# Patient Record
Sex: FEMALE | Race: WHITE | Employment: OTHER | URBAN - METROPOLITAN AREA
[De-identification: names, ages, dates, MRNs, and addresses within clinical notes are randomized per-mention and may not be internally consistent; named-entity substitution may affect disease eponyms.]

---

## 2020-12-21 ENCOUNTER — APPOINTMENT (OUTPATIENT)
Dept: CT IMAGING | Age: 75
End: 2020-12-21
Payer: MEDICARE

## 2020-12-21 ENCOUNTER — HOSPITAL ENCOUNTER (OUTPATIENT)
Age: 75
Setting detail: OBSERVATION
Discharge: HOME | End: 2020-12-23
Attending: EMERGENCY MEDICINE | Admitting: INTERNAL MEDICINE
Payer: MEDICARE

## 2020-12-21 ENCOUNTER — APPOINTMENT (OUTPATIENT)
Dept: GENERAL RADIOLOGY | Age: 75
End: 2020-12-21
Payer: MEDICARE

## 2020-12-21 PROBLEM — G45.9 TIA (TRANSIENT ISCHEMIC ATTACK): Status: ACTIVE | Noted: 2020-12-21

## 2020-12-21 LAB
A/G RATIO: 1.9 (ref 1.1–2.2)
ALBUMIN SERPL-MCNC: 4.8 G/DL (ref 3.4–5)
ALP BLD-CCNC: 79 U/L (ref 40–129)
ALT SERPL-CCNC: 14 U/L (ref 10–40)
AMPHETAMINE SCREEN, URINE: NORMAL
ANION GAP SERPL CALCULATED.3IONS-SCNC: 11 MMOL/L (ref 3–16)
AST SERPL-CCNC: 17 U/L (ref 15–37)
BARBITURATE SCREEN URINE: NORMAL
BASOPHILS ABSOLUTE: 0 K/UL (ref 0–0.2)
BASOPHILS RELATIVE PERCENT: 0.4 %
BENZODIAZEPINE SCREEN, URINE: NORMAL
BILIRUB SERPL-MCNC: 0.3 MG/DL (ref 0–1)
BILIRUBIN URINE: NEGATIVE
BLOOD, URINE: NEGATIVE
BUN BLDV-MCNC: 19 MG/DL (ref 7–20)
CALCIUM SERPL-MCNC: 10 MG/DL (ref 8.3–10.6)
CANNABINOID SCREEN URINE: NORMAL
CHLORIDE BLD-SCNC: 99 MMOL/L (ref 99–110)
CLARITY: CLEAR
CO2: 27 MMOL/L (ref 21–32)
COCAINE METABOLITE SCREEN URINE: NORMAL
COLOR: YELLOW
CREAT SERPL-MCNC: 1 MG/DL (ref 0.6–1.2)
EOSINOPHILS ABSOLUTE: 0.1 K/UL (ref 0–0.6)
EOSINOPHILS RELATIVE PERCENT: 1.2 %
ETHANOL: NORMAL MG/DL (ref 0–0.08)
GFR AFRICAN AMERICAN: >60
GFR NON-AFRICAN AMERICAN: 54
GLOBULIN: 2.5 G/DL
GLUCOSE BLD-MCNC: 117 MG/DL (ref 70–99)
GLUCOSE BLD-MCNC: 119 MG/DL (ref 70–99)
GLUCOSE URINE: NEGATIVE MG/DL
HCT VFR BLD CALC: 41 % (ref 36–48)
HEMOGLOBIN: 13.6 G/DL (ref 12–16)
KETONES, URINE: NEGATIVE MG/DL
LEUKOCYTE ESTERASE, URINE: NEGATIVE
LYMPHOCYTES ABSOLUTE: 2.3 K/UL (ref 1–5.1)
LYMPHOCYTES RELATIVE PERCENT: 24.8 %
Lab: NORMAL
MAGNESIUM: 2 MG/DL (ref 1.8–2.4)
MCH RBC QN AUTO: 30.6 PG (ref 26–34)
MCHC RBC AUTO-ENTMCNC: 33.1 G/DL (ref 31–36)
MCV RBC AUTO: 92.5 FL (ref 80–100)
METHADONE SCREEN, URINE: NORMAL
MICROSCOPIC EXAMINATION: NORMAL
MONOCYTES ABSOLUTE: 0.5 K/UL (ref 0–1.3)
MONOCYTES RELATIVE PERCENT: 5.5 %
NEUTROPHILS ABSOLUTE: 6.4 K/UL (ref 1.7–7.7)
NEUTROPHILS RELATIVE PERCENT: 68.1 %
NITRITE, URINE: NEGATIVE
OPIATE SCREEN URINE: NORMAL
OXYCODONE URINE: NORMAL
PDW BLD-RTO: 13.5 % (ref 12.4–15.4)
PERFORMED ON: ABNORMAL
PH UA: 6
PH UA: 6 (ref 5–8)
PHENCYCLIDINE SCREEN URINE: NORMAL
PLATELET # BLD: 287 K/UL (ref 135–450)
PMV BLD AUTO: 7.5 FL (ref 5–10.5)
POTASSIUM REFLEX MAGNESIUM: 3.4 MMOL/L (ref 3.5–5.1)
PROPOXYPHENE SCREEN: NORMAL
PROTEIN UA: NEGATIVE MG/DL
RBC # BLD: 4.43 M/UL (ref 4–5.2)
SODIUM BLD-SCNC: 137 MMOL/L (ref 136–145)
SPECIFIC GRAVITY UA: 1.01 (ref 1–1.03)
TOTAL PROTEIN: 7.3 G/DL (ref 6.4–8.2)
TROPONIN: <0.01 NG/ML
URINE REFLEX TO CULTURE: NORMAL
URINE TYPE: NORMAL
UROBILINOGEN, URINE: 0.2 E.U./DL
WBC # BLD: 9.4 K/UL (ref 4–11)

## 2020-12-21 PROCEDURE — 99285 EMERGENCY DEPT VISIT HI MDM: CPT

## 2020-12-21 PROCEDURE — 71045 X-RAY EXAM CHEST 1 VIEW: CPT

## 2020-12-21 PROCEDURE — 83735 ASSAY OF MAGNESIUM: CPT

## 2020-12-21 PROCEDURE — 2580000003 HC RX 258: Performed by: NURSE PRACTITIONER

## 2020-12-21 PROCEDURE — G0378 HOSPITAL OBSERVATION PER HR: HCPCS

## 2020-12-21 PROCEDURE — 70450 CT HEAD/BRAIN W/O DYE: CPT

## 2020-12-21 PROCEDURE — 85025 COMPLETE CBC W/AUTO DIFF WBC: CPT

## 2020-12-21 PROCEDURE — G0480 DRUG TEST DEF 1-7 CLASSES: HCPCS

## 2020-12-21 PROCEDURE — 80307 DRUG TEST PRSMV CHEM ANLYZR: CPT

## 2020-12-21 PROCEDURE — 93005 ELECTROCARDIOGRAM TRACING: CPT | Performed by: EMERGENCY MEDICINE

## 2020-12-21 PROCEDURE — 84484 ASSAY OF TROPONIN QUANT: CPT

## 2020-12-21 PROCEDURE — 80053 COMPREHEN METABOLIC PANEL: CPT

## 2020-12-21 PROCEDURE — 81003 URINALYSIS AUTO W/O SCOPE: CPT

## 2020-12-21 RX ORDER — SODIUM CHLORIDE 9 MG/ML
INJECTION, SOLUTION INTRAVENOUS CONTINUOUS
Status: ACTIVE | OUTPATIENT
Start: 2020-12-21 | End: 2020-12-22

## 2020-12-21 RX ORDER — ONDANSETRON 2 MG/ML
4 INJECTION INTRAMUSCULAR; INTRAVENOUS EVERY 6 HOURS PRN
Status: DISCONTINUED | OUTPATIENT
Start: 2020-12-21 | End: 2020-12-23 | Stop reason: HOSPADM

## 2020-12-21 RX ORDER — ASPIRIN 81 MG/1
81 TABLET ORAL DAILY
Status: DISCONTINUED | OUTPATIENT
Start: 2020-12-22 | End: 2020-12-23 | Stop reason: HOSPADM

## 2020-12-21 RX ORDER — SODIUM CHLORIDE 0.9 % (FLUSH) 0.9 %
10 SYRINGE (ML) INJECTION PRN
Status: DISCONTINUED | OUTPATIENT
Start: 2020-12-21 | End: 2020-12-23 | Stop reason: HOSPADM

## 2020-12-21 RX ORDER — PROMETHAZINE HYDROCHLORIDE 25 MG/1
12.5 TABLET ORAL EVERY 6 HOURS PRN
Status: DISCONTINUED | OUTPATIENT
Start: 2020-12-21 | End: 2020-12-23 | Stop reason: HOSPADM

## 2020-12-21 RX ORDER — POLYETHYLENE GLYCOL 3350 17 G/17G
17 POWDER, FOR SOLUTION ORAL DAILY PRN
Status: DISCONTINUED | OUTPATIENT
Start: 2020-12-21 | End: 2020-12-23 | Stop reason: HOSPADM

## 2020-12-21 RX ORDER — ASPIRIN 300 MG/1
300 SUPPOSITORY RECTAL DAILY
Status: DISCONTINUED | OUTPATIENT
Start: 2020-12-22 | End: 2020-12-23 | Stop reason: HOSPADM

## 2020-12-21 RX ORDER — ATORVASTATIN CALCIUM 80 MG/1
80 TABLET, FILM COATED ORAL NIGHTLY
Status: DISCONTINUED | OUTPATIENT
Start: 2020-12-21 | End: 2020-12-23 | Stop reason: HOSPADM

## 2020-12-21 RX ORDER — SODIUM CHLORIDE 0.9 % (FLUSH) 0.9 %
10 SYRINGE (ML) INJECTION EVERY 12 HOURS SCHEDULED
Status: DISCONTINUED | OUTPATIENT
Start: 2020-12-21 | End: 2020-12-23 | Stop reason: HOSPADM

## 2020-12-21 RX ORDER — LABETALOL HYDROCHLORIDE 5 MG/ML
10 INJECTION, SOLUTION INTRAVENOUS EVERY 4 HOURS PRN
Status: DISCONTINUED | OUTPATIENT
Start: 2020-12-21 | End: 2020-12-23 | Stop reason: HOSPADM

## 2020-12-21 RX ADMIN — SODIUM CHLORIDE: 9 INJECTION, SOLUTION INTRAVENOUS at 23:49

## 2020-12-22 ENCOUNTER — APPOINTMENT (OUTPATIENT)
Dept: GENERAL RADIOLOGY | Age: 75
End: 2020-12-22
Payer: MEDICARE

## 2020-12-22 ENCOUNTER — APPOINTMENT (OUTPATIENT)
Dept: VASCULAR LAB | Age: 75
End: 2020-12-22
Payer: MEDICARE

## 2020-12-22 ENCOUNTER — APPOINTMENT (OUTPATIENT)
Dept: MRI IMAGING | Age: 75
End: 2020-12-22
Payer: MEDICARE

## 2020-12-22 LAB
ANION GAP SERPL CALCULATED.3IONS-SCNC: 9 MMOL/L (ref 3–16)
BUN BLDV-MCNC: 14 MG/DL (ref 7–20)
CALCIUM SERPL-MCNC: 9.6 MG/DL (ref 8.3–10.6)
CHLORIDE BLD-SCNC: 103 MMOL/L (ref 99–110)
CHOLESTEROL, TOTAL: 245 MG/DL (ref 0–199)
CO2: 27 MMOL/L (ref 21–32)
CREAT SERPL-MCNC: 0.9 MG/DL (ref 0.6–1.2)
EKG ATRIAL RATE: 91 BPM
EKG DIAGNOSIS: NORMAL
EKG P AXIS: 61 DEGREES
EKG P-R INTERVAL: 144 MS
EKG Q-T INTERVAL: 350 MS
EKG QRS DURATION: 92 MS
EKG QTC CALCULATION (BAZETT): 430 MS
EKG R AXIS: 14 DEGREES
EKG T AXIS: 62 DEGREES
EKG VENTRICULAR RATE: 91 BPM
GFR AFRICAN AMERICAN: >60
GFR NON-AFRICAN AMERICAN: >60
GLUCOSE BLD-MCNC: 105 MG/DL (ref 70–99)
HCT VFR BLD CALC: 40.4 % (ref 36–48)
HDLC SERPL-MCNC: 94 MG/DL (ref 40–60)
HEMOGLOBIN: 13.6 G/DL (ref 12–16)
LDL CHOLESTEROL CALCULATED: 140 MG/DL
MCH RBC QN AUTO: 31.2 PG (ref 26–34)
MCHC RBC AUTO-ENTMCNC: 33.6 G/DL (ref 31–36)
MCV RBC AUTO: 93 FL (ref 80–100)
PDW BLD-RTO: 13.3 % (ref 12.4–15.4)
PLATELET # BLD: 267 K/UL (ref 135–450)
PMV BLD AUTO: 7.4 FL (ref 5–10.5)
POTASSIUM REFLEX MAGNESIUM: 4.1 MMOL/L (ref 3.5–5.1)
RBC # BLD: 4.35 M/UL (ref 4–5.2)
SODIUM BLD-SCNC: 139 MMOL/L (ref 136–145)
TRIGL SERPL-MCNC: 57 MG/DL (ref 0–150)
VLDLC SERPL CALC-MCNC: 11 MG/DL
WBC # BLD: 6.7 K/UL (ref 4–11)

## 2020-12-22 PROCEDURE — 93010 ELECTROCARDIOGRAM REPORT: CPT | Performed by: INTERNAL MEDICINE

## 2020-12-22 PROCEDURE — C8929 TTE W OR WO FOL WCON,DOPPLER: HCPCS

## 2020-12-22 PROCEDURE — G0378 HOSPITAL OBSERVATION PER HR: HCPCS

## 2020-12-22 PROCEDURE — 85027 COMPLETE CBC AUTOMATED: CPT

## 2020-12-22 PROCEDURE — 96372 THER/PROPH/DIAG INJ SC/IM: CPT

## 2020-12-22 PROCEDURE — 97161 PT EVAL LOW COMPLEX 20 MIN: CPT

## 2020-12-22 PROCEDURE — 6370000000 HC RX 637 (ALT 250 FOR IP): Performed by: NURSE PRACTITIONER

## 2020-12-22 PROCEDURE — 97165 OT EVAL LOW COMPLEX 30 MIN: CPT

## 2020-12-22 PROCEDURE — 70551 MRI BRAIN STEM W/O DYE: CPT

## 2020-12-22 PROCEDURE — 73600 X-RAY EXAM OF ANKLE: CPT

## 2020-12-22 PROCEDURE — 97530 THERAPEUTIC ACTIVITIES: CPT

## 2020-12-22 PROCEDURE — 92610 EVALUATE SWALLOWING FUNCTION: CPT

## 2020-12-22 PROCEDURE — 2580000003 HC RX 258: Performed by: NURSE PRACTITIONER

## 2020-12-22 PROCEDURE — 6360000002 HC RX W HCPCS: Performed by: NURSE PRACTITIONER

## 2020-12-22 PROCEDURE — 80048 BASIC METABOLIC PNL TOTAL CA: CPT

## 2020-12-22 PROCEDURE — 99219 PR INITIAL OBSERVATION CARE/DAY 50 MINUTES: CPT | Performed by: PSYCHIATRY & NEUROLOGY

## 2020-12-22 PROCEDURE — 80061 LIPID PANEL: CPT

## 2020-12-22 PROCEDURE — 36415 COLL VENOUS BLD VENIPUNCTURE: CPT

## 2020-12-22 PROCEDURE — 97110 THERAPEUTIC EXERCISES: CPT

## 2020-12-22 PROCEDURE — 83036 HEMOGLOBIN GLYCOSYLATED A1C: CPT

## 2020-12-22 PROCEDURE — 6360000004 HC RX CONTRAST MEDICATION: Performed by: NURSE PRACTITIONER

## 2020-12-22 RX ADMIN — ASPIRIN 81 MG: 81 TABLET, COATED ORAL at 09:16

## 2020-12-22 RX ADMIN — ENOXAPARIN SODIUM 40 MG: 40 INJECTION SUBCUTANEOUS at 09:16

## 2020-12-22 RX ADMIN — SODIUM CHLORIDE, PRESERVATIVE FREE 10 ML: 5 INJECTION INTRAVENOUS at 00:21

## 2020-12-22 NOTE — ED NOTES
RN spoke with daughter. Daughter stated patient laid down for a nap at 17:15 woke up at 18:00 disoriented. Pt stated \"something is wrong. \" Patient is on the go per daughter. 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       Cliff Verde RN  12/21/20 8966

## 2020-12-22 NOTE — ED NOTES
Bed: 14  Expected date:   Expected time:   Means of arrival:   Comments:  [address] Mcgeejeffry Lopez  12/21/20 2023

## 2020-12-22 NOTE — CONSULTS
In patient Neurology consult        [facility] Neurology      MD Onel Wiseman  1945    Date of Service: 2020    Referring Physician: Jared Rao MD      Reason for the consult and CC: Acute encephalopathy, speech impairment and possible stroke. HPI:   The patient is a 77y.o.  years old female without significant past medical history was admitted to the hospital yesterday with acute encephalopathy and speech impairment. Symptoms started yesterday afternoon. Description was sudden onset of confusion and difficulties with word findings and not following directions. She was having difficulties finding her way at her house. Degree was severe. Duration was at least 2 or 3 hours. No triggers or other associated symptoms. No falling or injury, weakness or numbness or recent fever. No chest pain. No other relieving or aggravating factors. She came to the ED where she was back to her baseline. Initial CT of the head showed no acute stroke. CTA showed no large vessel occlusion. She was admitted to the hospital.  Today she feels back to normal.  Blood pressure is mildly elevated. No history of strokes or TIA in the past.  She does not smoke. No history of DVT or PE. Other review of system was unremarkable. Family history: Noncontributory    Past medical history: Noncontributory      Past Surgical History:   Procedure Laterality Date    BREAST SURGERY       SECTION      COSMETIC SURGERY      EYE SURGERY      FRACTURE SURGERY      TONSILLECTOMY          History reviewed. No pertinent past medical history.   Social History     Tobacco Use    Smoking status: Former Smoker     Years: 2.00     Types: Cigarettes    Smokeless tobacco: Never Used   Substance Use Topics    Alcohol use: Yes     Comment: half a glass of wine most days    Drug use: Never     No Known Allergies  Current Facility-Administered Medications   Medication Dose Route Frequency Provider Last Rate Last Admin    sodium chloride flush 0.9 % injection 10 mL  10 mL Intravenous 2 times per day Sinan Elizabeth, APRN - CNP   10 mL at 12/22/20 0021    sodium chloride flush 0.9 % injection 10 mL  10 mL Intravenous PRN Sinan Elizabeth, APRN - CNP        promethazine (PHENERGAN) tablet 12.5 mg  12.5 mg Oral Q6H PRN Sinan Elizabeth, APRN - CNP        Or    ondansetron (ZOFRAN) injection 4 mg  4 mg Intravenous Q6H PRN Sinan Elizabeth, APRN - CNP        polyethylene glycol (GLYCOLAX) packet 17 g  17 g Oral Daily PRN Sinan Elizabeth, APRN - CNP        enoxaparin (LOVENOX) injection 40 mg  40 mg Subcutaneous Daily Sinan Elizabeth, APRN - CNP   40 mg at 12/22/20 0916    aspirin EC tablet 81 mg  81 mg Oral Daily Sinan Elizabeth, APRN - CNP   81 mg at 12/22/20 7307    Or    aspirin suppository 300 mg  300 mg Rectal Daily Sinan Elizabeth, APRN - CNP        perflutren lipid microspheres (DEFINITY) injection 1.65 mg  1.5 mL Intravenous ONCE PRN Sinan Elizabeth, APRN - CNP        atorvastatin (LIPITOR) tablet 80 mg  80 mg Oral Nightly Sinan Elizabeth, APRN - CNP        labetalol (NORMODYNE;TRANDATE) injection 10 mg  10 mg Intravenous Q4H PRN Sinan Elizabeth, APRN - CNP           ROS : A 10-14 system review of constitutional, cardiovascular, respiratory, eyes, musculoskeletal, endocrine, GI, ENT, skin, hematological, genitourinary, psychiatric and neurologic systems was obtained and updated today and is unremarkable except as mentioned in my HPI      Exam:     Constitutional:   Vitals:    12/21/20 2320 12/22/20 0336 12/22/20 0810 12/22/20 0910   BP: (!) 183/98 (!) 147/88 (!) 175/102 (!) 157/90   Pulse: 98 73  86   Resp: 20 18  16   Temp: 98.6 °F (37 °C) 97.8 °F (36.6 °C)  98.5 °F (36.9 °C)   TempSrc: Oral Oral  Oral   SpO2: 98% 98% 99% 98%   Weight:       Height:           General appearance:  Normal development and appear in no acute distress. Eye: No icterus.    Fundus: Funduscopic examination cannot be performed due to COVID19 restrictions and precautions. Neck: supple  Cardiovascular: No lower leg edema with good pulsation. Mental Status:   Oriented to person, place, problem, and time. Memory: Aware of recent and remote event. Good immediate recall. Intact remote memory  Normal attention span and concentration. Language: intact naming, repeating and fluency   Good fund of Knowledge. Aware of current events and vocabulary   Cranial Nerves:   II: Artificial right eye. Left pupils: equal, round, reactive to light  III,IV,VI: Extra Ocular Movements are intact on the left and limited on the right due to artificial eye. No nystagmus  V: Facial sensation is intact   VII: Facial strength and movements: intact and symmetric  VIII: Hearing: Intact  IX: Palate elevation is symmetric  XI: Shoulder shrug is intact  XII: Tongue movements are normal  Musculoskeletal: 5/5 in all 4 extremities. Tone: Normal tone. Reflexes: 2+ in the upper extremity and 2+ in the lower extremity   Planters: flexor bilaterally. Coordination: no pronator drift, no dysmetria with FNF in upper extremities. Normal REM. Sensation: normal to all modalities in both arms and legs. Gait/Posture: Steady. Patient  Data:  LABS:   Lab Results   Component Value Date     12/22/2020    K 4.1 12/22/2020     12/22/2020    CO2 27 12/22/2020    BUN 14 12/22/2020    CREATININE 0.9 12/22/2020    GFRAA >60 12/22/2020    LABGLOM >60 12/22/2020    GLUCOSE 105 12/22/2020    MG 2.00 12/21/2020    CALCIUM 9.6 12/22/2020     Lab Results   Component Value Date    WBC 6.7 12/22/2020    RBC 4.35 12/22/2020    HGB 13.6 12/22/2020    HCT 40.4 12/22/2020    MCV 93.0 12/22/2020    RDW 13.3 12/22/2020     12/22/2020   No results found for: INR, PROTIME    Neuroimaging were independently reviewed by myself and discussed results with the patient  Reviewed notes from different physicians  Reviewed lab and blood testing    Impression:  Acute aphasia and encephalopathy.   Possible TIA versus CVA. So far cryptogenic  Hypertension. ?  New diagnosis    Recommendation:  MRI brain  Echo  Carotid Doppler  Telemetry  A1c  Lipid panel  Aspirin  Statin  DVT and GI prophylaxis  Speech evaluation  Monitor blood pressure for now  Neurochecks  Stroke prevention was discussed with the patient  Will need event monitor with her PCP for four weeks giving possible cryptogenic TIA versus CVA  DC planning likely tomorrow. Thank you for referring such patient. If you have any questions regarding my consult note, please don't hesitate to call me. Anali Ibarra MD  [phone]    This dictation was generated by voice recognition computer software.  Although all attempts are made to edit the dictation for accuracy, there may be errors in the  transcription that are not intended

## 2020-12-22 NOTE — PROGRESS NOTES
Occupational Therapy   Occupational Therapy Initial Assessment and Treatment  Date: 2020   Patient Name: Noy Guillen  MRN: [id number]     : 1945    Date of Service: 2020    Discharge Recommendations:  Home with assist PRN and OP therapy for LUE strength limitations     Assessment   Performance deficits / Impairments: Decreased strength  Assessment: Patient presents as 77year old female s/p admission for speech disturbance and confusion and today demonstrates no confusion via Short Blessed test although does demonstrate high blood pressure and slightly decreased LUE strength that may be attributed to a prior shoulder injury per patient report. Patient was provided exercises this date and educated on benefits of OP therapy for maximizing LUE strength and function. Patient verbalizes understanding of recommendations for home with assist PRN and OP therapy to manage LUE strength limitations despite a potentially chronic issue. Patient is safe to discharge home with assist PRN. Prognosis: Good  Decision Making: Low Complexity  OT Education: OT Role;Plan of Care;Home Exercise Program  Patient Education: disease specific: role of OT, fall risk precautions, discharge recommendations  REQUIRES OT FOLLOW UP: Yes  Activity Tolerance  Activity Tolerance: Patient Tolerated treatment well  Activity Tolerance: B.P. 176/100, HR 87,O2 99%  Safety Devices  Safety Devices in place: Yes  Type of devices: Left in bed;Nurse notified;Call light within reach         Patient Diagnosis(es): The primary encounter diagnosis was TIA (transient ischemic attack). A diagnosis of Transient alteration of awareness was also pertinent to this visit. has no past medical history on file. has a past surgical history that includes Breast surgery; eye surgery; fracture surgery; Tonsillectomy; Cosmetic surgery; and  section.     Restrictions  Restrictions/Precautions  Restrictions/Precautions: Fall Risk, General Precautions  Position Activity Restriction  Other position/activity restrictions: full code, up as tolerated, medium fall risk per nursing order    Subjective   General  Chart Reviewed: Yes  Patient assessed for rehabilitation services?: Yes  Additional Pertinent Hx: entered hospital with speech disturbance per chart review  Referring Practitioner: Rose Colin NP, 12/21  Diagnosis: TIA  Subjective  Subjective: Patient agreeable to OT evaluation and treatment, in bed at time of entry. RN approved OT.   Patient Currently in Pain: Denies  Vital Signs  Level of Consciousness: Alert (0)  Patient Currently in Pain: Denies  Social/Functional History  Social/Functional History  Lives With: Spouse  Type of Home: Apartment  Home Layout: One level  Home Access: Level entry  Bathroom Shower/Tub: Tub/Shower unit  Bathroom Toilet: Standard  Bathroom Equipment: Grab bars in shower  ADL Assistance: Independent  Homemaking Assistance: 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[address]: walking every day, walking with friends 2x/week     Objective   Vision: Impaired  Vision Exceptions: Wears glasses at all times  Hearing: Within functional limits    Orientation  Overall Orientation Status: Within Normal Limits  Observation/Palpation  Posture: Fair  Observation: slightly winged LUE scapula, history of shoulder injury per patient report  Balance  Sitting Balance: Independent  Standing Balance: Modified independent   Toilet Transfers  Toilet - Technique: Ambulating  Toilet Transfer: Stand by assistance  Toilet Transfers Comments: cues to bring IV  ADL  Grooming: Modified independent ;Supervision  LE Dressing: Modified independent ;Supervision  Toileting: Modified independent ;Supervision  Tone RUE  RUE Tone: Normotonic  Tone LUE  LUE Tone: Normotonic  Coordination  Movements Are Fluid And Coordinated: Yes     Bed mobility  Supine to Sit: Supervision  Sit to Supine: Supervision  Transfers  Sit to stand: Supervision  Stand to sit: Supervision  Vision - Basic Assessment  Prior Vision: Wears glasses all the time  Patient Visual Report: No visual complaint reported. Vision Comments: prosthetic eye  Cognition  Overall Cognitive Status: WFL  Cognition Comment: Short Blessed Test Score of 0 indicating normal cognition  Perception  Overall Perceptual Status: WFL     Sensation  Overall Sensation Status: WFL        LUE AROM (degrees)  LUE AROM : WFL  LUE General AROM: slightly limited for full range due to previous shoulder injury  RUE AROM (degrees)  RUE AROM : WFL  LUE Strength  Gross LUE Strength: WFL  LUE Strength Comment: slightly decreased distally for LUE 3+/5  RUE Strength  Gross RUE Strength: WFL  RUE Strength Comment: 4+/5 for RUE     Hand Dominance  Hand Dominance: Right     Plan   Plan  Times per week: 1 additional visit to review HEP PRN     Access Code: [email or web address]   URL: [email or web address]   Date: 12/22/2020   Prepared by: Naeem Heading     Exercises   Seated Scapular Retraction - 10 reps - 1-2 sets - 1-2x daily - 7x weekly   Prone W Scapular Retraction - 10 reps - 1-2 sets - 1-2x daily - 7x weekly   Seated Forearm Pronation and Supination with Hammer - 10 reps - 1-2 sets - 1-2x daily - 7x weekly   Seated Wrist Extension with Dumbbell - 10 reps - 1-2 sets - 1-2x daily - 7x weekly   Wrist Flexion with Dumbbell - 10 reps - 1-2 sets - 1-2x daily - 7x weekly     AM-PAC Score  AM-PAC Inpatient Daily Activity Raw Score: 24 (12/22/20 1107)  AM-PAC Inpatient ADL T-Scale Score : 57.54 (12/22/20 1107)  ADL Inpatient CMS 0-100% Score: 0 (12/22/20 1107)  ADL Inpatient CMS G-Code Modifier : [address] (12/22/20 1107)    Goals  Short term goals  Time Frame for Short term goals: 2-3 days  Short term goal 1: Patient will be I with UE HEP for LUE strengthening for maximizing return to more independent level of functioning. Patient Goals   Patient goals : 1-2 additional visits       Therapy Time   Individual Concurrent Group Co-treatment   Time In 5234         Time Out 0850         Minutes 60         Timed Code Treatment Minutes: 25 Minutes       Shakir Merlos OTR/L    If this patient discharges from acute care setting prior to completion of discharge summary, let this note serve as discharge note as it was functioning as assessed prior to discharge. Thank you.   Shakir Merlos OTR/L

## 2020-12-22 NOTE — PROGRESS NOTES
4 Eyes Skin Assessment     The patient is being assess for   Admission    I agree that 2 RN's have performed a thorough Head to Toe Skin Assessment on the patient. ALL assessment sites listed below have been assessed. Areas assessed by both nurses:   [x]   Head, Face, and Ears   [x]   Shoulders, Back, and Chest, Abdomen  [x]   Arms, Elbows, and Hands   [x]   Coccyx, Sacrum, and Ischium  [x]   Legs, Feet, and Heels        Dry, cracked skin on palms of hands and feet    **SHARE this note so that the co-signing nurse is able to place an eSignature**    Co-signer eSignature: Electronically signed by Socorro Banks RN on 12/22/20 at 3:28 AM EST    Does the Patient have Skin Breakdown?   No          Yuniel Prevention initiated:  NO}   Wound Care Orders initiated: NO      WOC nurse consulted for Pressure Injury (Stage 3,4, Unstageable, DTI, NWPT, Complex wounds)and New or Established Ostomies:  NO  Primary Nurse eSignature: Electronically signed by Michel Burnette RN on 12/22/20 at 12:31 AM EST

## 2020-12-22 NOTE — PLAN OF CARE
Problem: Musculor/Skeletal Functional Status  Intervention: OT Evaluation/treatment  Note: . Problem: Musculor/Skeletal Functional Status  Intervention: Fall precautions  Note: .       Frieda Hwang, OTR/L

## 2020-12-22 NOTE — ED NOTES
Patient identified as a positive fall risk on the ED triage fall screening. Patient placed in fall precautions which includes:  yellow fall risk bracelet on wrist,  \"Be Safe\" sign placed on patient's door, and bed alarm placed under patient/alarm turned on. Patient instructed on importance of not getting out of bed or ambulating without assistance for safety.           Destin Benton RN  12/21/20 7036

## 2020-12-22 NOTE — PROGRESS NOTES
Speech Language Pathology    SLP acknowledged order for BSE, reviewed pt's chart. Echo currently at bedside. ST to continue to follow and re-attempt eval later this date as pt is able and appropriate.     Reny Lim M.S. [address] [facility]  Speech-language pathologist  [id number]

## 2020-12-22 NOTE — PROGRESS NOTES
Hospitalist Progress Note      PCP: Mary Lerner    Date of Admission: 12/21/2020    Chief Complaint: Confusion    Hospital Course: See H&P      Subjective:   Patient is up in bed, comfortable, not in distress. No new event overnight. Medications:  Reviewed    Infusion Medications    sodium chloride 75 mL/hr at 12/21/20 1269     Scheduled Medications    sodium chloride flush  10 mL Intravenous 2 times per day    enoxaparin  40 mg Subcutaneous Daily    aspirin  81 mg Oral Daily    Or    aspirin  300 mg Rectal Daily    atorvastatin  80 mg Oral Nightly     PRN Meds: sodium chloride flush, promethazine **OR** ondansetron, polyethylene glycol, perflutren lipid microspheres, labetalol    No intake or output data in the 24 hours ending 12/22/20 0959    Physical Exam Performed:    BP (!) 157/90   Pulse 86   Temp 98.5 °F (36.9 °C) (Oral)   Resp 16   Ht 5' 3.5\" (1.613 m)   Wt 145 lb (65.8 kg)   SpO2 98%   BMI 25.28 kg/m²     General appearance: No apparent distress, appears stated age and cooperative. HEENT: Pupils equal, round, and reactive to light. Conjunctivae/corneas clear. Neck: Supple, with full range of motion. No jugular venous distention. Trachea midline. Respiratory:  Normal respiratory effort. Clear to auscultation, bilaterally without Rales/Wheezes/Rhonchi. Cardiovascular: Regular rate and rhythm with normal S1/S2 without murmurs, rubs or gallops. Abdomen: Soft, non-tender, non-distended with normal bowel sounds. Musculoskeletal: No clubbing, cyanosis or edema bilaterally. Full range of motion without deformity. Skin: Skin color, texture, turgor normal.  No rashes or lesions. Neurologic:  Neurovascularly intact without any focal sensory/motor deficits.  Cranial nerves: II-XII intact, grossly non-focal.  Psychiatric: Alert and oriented, thought content appropriate, normal insight  Capillary Refill: Brisk,< 3 seconds   Peripheral Pulses: +2 palpable, equal bilaterally Labs:   Recent Labs     12/21/20 2025 12/22/20  0823   WBC 9.4 6.7   HGB 13.6 13.6   HCT 41.0 40.4    267     Recent Labs     12/21/20 2025 12/22/20  0823    139   K 3.4* 4.1   CL 99 103   CO2 27 27   BUN 19 14   CREATININE 1.0 0.9   CALCIUM 10.0 9.6     Recent Labs     12/21/20 2025   AST 17   ALT 14   BILITOT 0.3   ALKPHOS 79     No results for input(s): INR in the last 72 hours. Recent Labs     12/21/20 2025   TROPONINI <0.01       Urinalysis:      Lab Results   Component Value Date    NITRU Negative 12/21/2020    BLOODU Negative 12/21/2020    SPECGRAV 1.015 12/21/2020    GLUCOSEU Negative 12/21/2020       Radiology:  XR CHEST PORTABLE   Final Result   No significant findings in the chest.         CT HEAD WO CONTRAST   Final Result   No acute intracranial abnormality. Mild chronic small vessel ischemic disease. MRI brain without contrast    (Results Pending)   Vascular carotid duplex bilateral    (Results Pending)           Assessment/Plan:    Active Hospital Problems    Diagnosis    TIA (transient ischemic attack) [G45.9]     TIA vs CVA - symptoms include confusion and difficulty speaking  -CT head revealed: no acute intracranial abnormaltiy  -UDS negative  -UA negative for infection  -ethanol negative  -neuro checks  -pt/ot evaluation  -MRI brain w/o contrast in am  -carotid duplex in am  -Monitor on tele. -Consult neurology for further recs - appreciated in advance.     DVT Prophylaxis: Lovenox  Diet: DIET GENERAL;  Code Status: Full Code    PT/OT Eval Status: Ambulatory    Dispo - in 1-2 days    Shane Galeana MD

## 2020-12-22 NOTE — PLAN OF CARE
Problem: Nutrition  Intervention: Swallowing evaluation  Note: Bedside swallow evaluation completed this date. Naun Alcaraz M.S. [id number] [facility]  Speech-language pathologist  [id number]      Intervention: Aspiration precautions  Note: Bedside swallow evaluation completed this date.     Naun Alcaraz M.S. [address] [facility]  Speech-language pathologist  [id number]

## 2020-12-22 NOTE — ED PROVIDER NOTES
[facility]  ED  EMERGENCY DEPARTMENT ENCOUNTER      Pt Name: Marco Pickett  MRN: [id number]  [place] 1945  Date of evaluation: 12/21/2020  Provider: Katty Solis MD    CHIEF COMPLAINT       Chief Complaint   Patient presents with    Altered Mental Status     confusion noted by , starting approximately 97 [id number]; also had an episode of incontinence while napping         HISTORY OF PRESENT ILLNESS   (Location/Symptom, Timing/Onset, Context/Setting, Quality, Duration, Modifying Factors, Severity)  Note limiting factors. Marco Pickett is a 77 y.o. female with past medical history of remote trauma with prosthetic right eye here today for confusion and altered mental status    Patient was reportedly last normal between 5 and 5:15 PM when she laid down to take a nap. She awoke between 615 and 630 and was confused. Family states that she was disoriented, confused, was having trouble speaking and trouble finding words and was not recognizing her house. They note that this lasted 1 to 2 hours and resolve spontaneously. The patient now states she feels fine. Denies headache, numbness, tingling or weakness. Denies current change in her vision. She does states she remembers being somewhat confused and disoriented but cannot remember anything further. There is no report of seizure activity. Her  did report that she lost urinary continence, but she currently states her clothes are not soiled. No prior history of seizure. She states she drinks alcohol but only has approximately 1 glass of wine a night, and even then usually has less. HPI    Nursing Notes were reviewed. REVIEW OF SYSTEMS    (2-9 systems for level 4, 10 or more for level 5)     Review of Systems    Please see HPI for pertinent positive and negative review of system findings. A full 10 system ROS was performed and otherwise negative. PAST MEDICAL HISTORY   History reviewed.  No pertinent past medical history. SURGICAL HISTORY     History reviewed. No pertinent surgical history. CURRENT MEDICATIONS       Previous Medications    No medications on file       ALLERGIES     Patient has no known allergies. FAMILY HISTORY     History reviewed. No pertinent family history.        SOCIAL HISTORY       Social History     Socioeconomic History    Marital status:      Spouse name: None    Number of children: None    Years of education: None    Highest education level: None   Occupational History    None   Social Needs    Financial resource strain: None    Food insecurity     Worry: None     Inability: None    Transportation needs     Medical: None     Non-medical: None   Tobacco Use    Smoking status: Former Smoker     Years: 2.00     Types: Cigarettes    Smokeless tobacco: Never Used   Substance and Sexual Activity    Alcohol use: Yes     Comment: half a glass of wine most days    Drug use: Never    Sexual activity: None   Lifestyle    Physical activity     Days per week: None     Minutes per session: None    Stress: None   Relationships    Social connections     Talks on phone: None     Gets together: None     Attends Baptism service: None     Active member of club or organization: None     Attends meetings of clubs or organizations: None     Relationship status: None    Intimate partner violence     Fear of current or ex partner: None     Emotionally abused: None     Physically abused: None     Forced sexual activity: None   Other Topics Concern    None   Social History Narrative    None       SCREENINGS    Emery Coma Scale  Eye Opening: Spontaneous  Best Verbal Response: Oriented  Best Motor Response: Obeys commands  Emery Coma Scale Score: 15          PHYSICAL EXAM    (up to 7 for level 4, 8 or more for level 5)     ED Triage Vitals [12/21/20 2022]   BP Temp Temp Source Pulse Resp SpO2 Height Weight   (!) 172/105 98.6 °F (37 °C) Oral 102 14 100 % 5' 3.5\" (1.613 m) 145 lb (65.8 kg)       Physical Exam    General appearance:  Cooperative. No acute distress. Skin:  Warm. Dry. Eye:  Extraocular movements intact in the left eye, right eye prosthetic. The left pupil is round and reactive to light appropriately. Right eye prosthetic. CN II-XII intact. Ears, nose, mouth and throat:  Oral mucosa moist,  Neck:  Trachea midline. Heart:  Regular rate and rhythm  Perfusion:  intact  Respiratory:  Lungs clear to auscultation bilaterally. Respirations nonlabored. Abdominal:   Non distended. Nontender  Neurological:  Alert and oriented x 3. Moves all extremities spontaneously. Intact sensation throughout. Intact finger-to-nose bilaterally. No drift in the upper or lower extremities.   Gait normal.  2+ bilateral patellar reflexes  Musculoskeletal:   Normal ROM, no deformities          Psychiatric:  Normal mood      DIAGNOSTIC RESULTS       Labs Reviewed   COMPREHENSIVE METABOLIC PANEL W/ REFLEX TO MG FOR LOW K - Abnormal; Notable for the following components:       Result Value    Potassium reflex Magnesium 3.4 (*)     Glucose 119 (*)     GFR Non- 54 (*)     All other components within normal limits    Narrative:     Performed at:  [place] [address] [address]   Phone [phone]   POCT GLUCOSE - Abnormal; Notable for the following components:    POC Glucose 117 (*)     All other components within normal limits    Narrative:     Performed at:  [place] [address] [address]   Phone [phone]   CBC WITH AUTO DIFFERENTIAL    Narrative:     Performed at:  [place] [address] [address]   Phone [phone]   URINE RT REFLEX TO CULTURE    Narrative:     Performed at:  [place] [address] [address]   Phone [phone]   [address] procedures. There was a high probability of clinically significant/life threatening deterioration in the patient's condition which required my urgent intervention. This time was spent reviewing the patient chart, interpreting diagnostic/laboratory data, emergent evaluation for concern of possible acute ischemic stroke      REASSESSMENT          PROCEDURE     Unless otherwise noted below, none     Procedures      FINAL IMPRESSION      1. TIA (transient ischemic attack)    2. Transient alteration of awareness            DISPOSITION/PLAN   DISPOSITION Admitted 12/21/2020 10:30:53 PM        PATIENT REFERRED TO:  No follow-up provider specified. DISCHARGE MEDICATIONS:  New Prescriptions    No medications on file     Controlled Substances Monitoring:     No flowsheet data found.     (Please note that portions of this note were completed with a voice recognition program.  Efforts were made to edit the dictations but occasionally words are mis-transcribed.)    Sergo De La Garza MD (electronically signed)  Attending Emergency Physician            Mandi Coto MD  12/21/20 [phone]

## 2020-12-22 NOTE — ED NOTES
[facility] [id number]  Per Dr.Conine ASHISH Sweet @[address] [place] [address] [facility]  12/21/20 [id number]

## 2020-12-22 NOTE — CARE COORDINATION
Pt a/o, able to ambulate, has PCP and insurance. Pt still teaches aerobics part time, drives an lives in an apartment w/0 stairs with her spouse. Pt has grab bars in shower. Pt has not DCP needs at this time. Should needs arise, please contact DCP.

## 2020-12-22 NOTE — PROGRESS NOTES
Speech Language Pathology  Facility/Department: [facility] C5 - MED SURG/ORTHO   CLINICAL BEDSIDE SWALLOW EVALUATION and Discharge    Recommended Diet and Intervention  Diet Solids Recommendation: Regular  Liquid Consistency Recommendation: Thin  Recommended Form of Meds: PO  No further ST indicated at this time; please re-refer ST if changes occur    NAME: Joyce Gaines  : 1945  MRN: [id number]    ADMISSION DATE: 2020  ADMITTING DIAGNOSIS: has TIA (transient ischemic attack) on their problem list.  ONSET DATE: Pt currently under observation status at [facility]    Recent Chest Xray (20): Impression   No significant findings in the chest.       Date of Eval: 2020  Evaluating Therapist: Dilcia Cortez    Current Diet level:  Current Diet : Regular  Current Liquid Diet : Thin      Primary Complaint  Patient Complaint: Per MD H&P, \"68 y.o. female, with essentially no PMH, who presented to [facility] with confusion and difficulty speaking. History obtained from the patient and review of EMR. The patient stated she is really not sure what has happened. She stated she felt fine all day today. The patient stated she laid down between 5 and 515 to take a nap. However, the patient's  reported she awoke between 615and 56 and was confused. He reported that the patient was disoriented and having trouble speaking, finding her words and was not recognizing their house. The patient's  reported that the patient symptoms lasted roughly 2 hours and resolved spontaneously. The patients  also noted that the patient did have one episode of incontinence. Upon arrival to the emergency room the patient was alert and oriented x4. Worried that she did remember being somewhat confused, but did not remember anything further. In the ED the patient had a CT head that revealed no acute intracranial abnormality. She will be admitted for further evaluation.   MRI of the brain, bilateral carotid duplex, echocardiogram and neurology consult have been ordered for the a.m. The patient denied any other associated symptoms as well as any aggravating and/or alleviating factors. At the time of this assessment, the patient was resting comfortably in bed. She currently denies any chest pain, back pain, abdominal pain, shortness of breath, numbness, tingling, N/V/C/D, fever and/or chills\". Pain: Pt denied       Reason for Referral  Héctor Cole was referred for a bedside swallow evaluation to assess the efficiency of her swallow function, identify signs and symptoms of aspiration and make recommendations regarding safe dietary consistencies, effective compensatory strategies, and safe eating environment. Impression  Dysphagia Diagnosis: Swallow function appears grossly intact  Dysphagia Outcome Severity Scale: Level 7: Normal in all situations   Pt seen upright in chair with breakfast, alert and agreeable to evaluation. Pt denied speech/lang/cog deficits, reported return to baseline. Pt able to participate in conversation with SLP without difficulty, adding appropriate comments and questions throughout. She denied dysphagia, no hx of dysphagia per chart review. Pt observed with regular solid, mech soft, and thin liquid trials via cup and straw during BSE with grossly timely swallow initiation throughout, no overt s/s of aspiration/penetration -- no coughing, throat clearing, wet vocal quality, or change in RR / WOB throughout. Adequate A-P bolus transit and rotary chew pattern with solid PO, no oral / lingual residue post-swallow. Swallow function appears grossly WFL. Recommend continuing pt's current Regular solid diet with thin liquids at this time. Further ST not indicated at this time, please re-refer ST if changes occur.      Treatment Plan  Requires SLP Intervention: No  Duration/Frequency of Treatment: No further ST indicated at this time, swallow function grossly [facility]  D/C good  Individuals consulted  Consulted and agree with results and recommendations: Patient;RN    Education  Patient Education: Pt educated on reason for referral, role of ST, assessment results and recommendations. Patient Education Response: Verbalizes understanding  Safety Devices in place: Yes  Type of devices: Call light within reach; Chair alarm in place; Left in chair       Therapy Time  SLP Individual Minutes  Time In: 6432  Time Out: 8801  Minutes: 10; dysphagia eval    Conner Cheatham M.S. [facility]  Speech-language pathologist  [phone]

## 2020-12-22 NOTE — PROGRESS NOTES
Physical Therapy    Facility/Department: [facility] C5 - MED SURG/ORTHO  Initial Assessment/ Discharge    NAME: Davey Tayolr  : 1945  MRN: [id number]    Date of Service: 2020    Discharge Recommendations:  Home independently   PT Equipment Recommendations  Equipment Needed: No    Assessment   Assessment: Pt is 78 yo female on OBS for TIA. Pt awoke from nap confused wtih decreased speech which resolved in ~ 2 hrs. per chart. Today pt is able to demonstrate baseline independence with bed mob, transfers and amb. Sensation, strength and balance are WNL. Pt voices understanding of general safety, and of signs/symptoms of stroke and importance of seeking immediate medical attn. No additional PT planned  Prognosis: Excellent  Decision Making: Low Complexity  PT Education: Goals; Disease Specific Education;General Safety;Plan of Care  Patient Education: pt educated in signs and symptoms of stroke and need to seek immediate medical attn  Barriers to Learning: none  REQUIRES PT FOLLOW UP: No  Activity Tolerance  Activity Tolerance: Patient Tolerated treatment well       Patient Diagnosis(es): The primary encounter diagnosis was TIA (transient ischemic attack). A diagnosis of Transient alteration of awareness was also pertinent to this visit. has no past medical history on file. has a past surgical history that includes Breast surgery; eye surgery; fracture surgery; Tonsillectomy; Cosmetic surgery; and  section.     Restrictions  Restrictions/Precautions: Fall Risk, General Precautions  Other position/activity restrictions: full code, up as tolerated, medium fall risk per nursing order  Vision/Hearing  Vision: Impaired  Vision Exceptions: Wears glasses at all times  Hearing: Within functional limits     Subjective  Chart Reviewed: Yes  Patient assessed for rehabilitation services?: Yes  Referring Practitioner: Meredith Cuello  Referral Date : 20  Diagnosis: TIA with confusion, decreased speech and one episode of incontinence  Follows Commands: Within Functional Limits  Comments: RN cleared pt for therapy, pt resting in bed on approach  Subjective: Agrees to therapy  Pain Screening  Patient Currently in Pain: Denies    Orientation  Overall Orientation Status: Within Normal Limits  Social/Functional History  Social/Functional History  Lives With: Spouse  Type of Home: Apartment  Home Layout: One level  Home Access: Level entry  Bathroom Shower/Tub: Tub/Shower unit  Bathroom Toilet: Standard  Bathroom Equipment: Grab bars in shower  ADL Assistance: Independent  Homemaking Assistance: Independent  Homemaking Responsibilities: Yes  Ambulation Assistance: Independent  Transfer Assistance: Independent  Active : Yes  Occupation: Unemployed  Type of occupation: [employer] , laid off due to [address]: walking every day, walking with friends 2x/week    Objective   RLE AROM: WFL  LLE AROM : WFL  Strength RLE: WFL  Strength LLE: WFL   Bed mobility  Supine to Sit: Independent  Sit to Supine: Independent  Transfers  Sit to Stand: Independent  Stand to sit: Independent  Ambulation  Device: No Device  Assistance: Independent  Quality of Gait: WFL  Distance: 400 ft     Balance  Sitting - Static: Good  Sitting - Dynamic: Good  Standing - Static: Good  Standing - Dynamic: Good  Comments: no loss of balance turning in [place], performing slow march or during amb  Exercises  Straight Leg Raise: 5 x B  Hip Flexion: Standing slow march 5 x B  Hip Extension/Leg Presses: Sit to and from stand 5 x in successio  Ankle Pumps: 10 x B    Standing B heel raise 10 x     Plan   Times per week: 1 x only  Current Treatment Recommendations: Safety Education & Training  Safety Devices  Type of devices:  All fall risk precautions in place, Patient at risk for falls, Nurse notified, Call light within reach, Left in chair      AM-PAC Score 24/24     Goals  Short term goals  Time Frame for Short term goals: 1 visit  Short term goal 1: pt

## 2020-12-23 VITALS
BODY MASS INDEX: 24.75 KG/M2 | HEIGHT: 64 IN | WEIGHT: 145 LBS | HEART RATE: 80 BPM | DIASTOLIC BLOOD PRESSURE: 84 MMHG | TEMPERATURE: 98.4 F | OXYGEN SATURATION: 99 % | SYSTOLIC BLOOD PRESSURE: 149 MMHG | RESPIRATION RATE: 18 BRPM

## 2020-12-23 LAB
ESTIMATED AVERAGE GLUCOSE: 119.8 MG/DL
HBA1C MFR BLD: 5.8 %

## 2020-12-23 PROCEDURE — 2580000003 HC RX 258: Performed by: NURSE PRACTITIONER

## 2020-12-23 PROCEDURE — 6370000000 HC RX 637 (ALT 250 FOR IP): Performed by: NURSE PRACTITIONER

## 2020-12-23 PROCEDURE — G0378 HOSPITAL OBSERVATION PER HR: HCPCS

## 2020-12-23 PROCEDURE — 96372 THER/PROPH/DIAG INJ SC/IM: CPT

## 2020-12-23 PROCEDURE — 6360000002 HC RX W HCPCS: Performed by: NURSE PRACTITIONER

## 2020-12-23 RX ADMIN — ENOXAPARIN SODIUM 40 MG: 40 INJECTION SUBCUTANEOUS at 08:22

## 2020-12-23 RX ADMIN — ASPIRIN 81 MG: 81 TABLET, COATED ORAL at 08:21

## 2020-12-23 RX ADMIN — SODIUM CHLORIDE, PRESERVATIVE FREE 10 ML: 5 INJECTION INTRAVENOUS at 08:23

## 2020-12-23 NOTE — CARE COORDINATION
CASE MANAGEMENT DISCHARGE SUMMARY      Discharge to: home w/ no needs        Transportation: private       Confirmed discharge plan with:     Patient: yes   RN, name: Alejo Harry RN

## 2020-12-23 NOTE — DISCHARGE SUMMARY
Hospital Medicine Discharge Summary    Patient ID: Wyvonnia Brandin      Patient's PCP: Irlanda Christian    Admit Date: 12/21/2020     Discharge Date:   12/23/2020    Admitting Physician: Scott Martines DO     Discharge Physician: Thanh Hylton MD     Discharge Diagnoses: Active Hospital Problems    Diagnosis    Aphasia [R47.01]    HTN (hypertension), benign [I10]    TIA (transient ischemic attack) [G45.9]       The patient was seen and examined on day of discharge and this discharge summary is in conjunction with any daily progress note from day of discharge. Hospital Course:     TIA vs CVA - symptoms include confusion and difficulty speaking  -CT head revealed: no acute intracranial abnormaltiy  -UDS negative  -UA negative for infection  -ethanol negative  -neuro checks  -pt/ot evaluation  -MRI brain w/o contrast - Normal.  -carotid duplex in am  -Monitor on tele. -Consult neurology for further recs - appreciated in advance. Physical Exam Performed:     BP (!) 149/84   Pulse 80   Temp 98.4 °F (36.9 °C) (Oral)   Resp 18   Ht 5' 3.5\" (1.613 m)   Wt 145 lb (65.8 kg)   SpO2 99%   BMI 25.28 kg/m²       General appearance:  No apparent distress, appears stated age and cooperative. HEENT:  Normal cephalic, atraumatic without obvious deformity. Pupils equal, round, and reactive to light. Extra ocular muscles intact. Conjunctivae/corneas clear. Neck: Supple, with full range of motion. No jugular venous distention. Trachea midline. Respiratory:  Normal respiratory effort. Clear to auscultation, bilaterally without Rales/Wheezes/Rhonchi. Cardiovascular:  Regular rate and rhythm with normal S1/S2 without murmurs, rubs or gallops. Abdomen: Soft, non-tender, non-distended with normal bowel sounds. Musculoskeletal:  No clubbing, cyanosis or edema bilaterally. Full range of motion without deformity. Skin: Skin color, texture, turgor normal.  No rashes or lesions.   Neurologic: